# Patient Record
Sex: FEMALE | Race: WHITE
[De-identification: names, ages, dates, MRNs, and addresses within clinical notes are randomized per-mention and may not be internally consistent; named-entity substitution may affect disease eponyms.]

---

## 2018-01-05 ENCOUNTER — HOSPITAL ENCOUNTER (EMERGENCY)
Dept: HOSPITAL 80 - FED | Age: 38
Discharge: HOME | End: 2018-01-05
Payer: COMMERCIAL

## 2018-01-05 VITALS
SYSTOLIC BLOOD PRESSURE: 129 MMHG | RESPIRATION RATE: 16 BRPM | DIASTOLIC BLOOD PRESSURE: 78 MMHG | HEART RATE: 69 BPM | OXYGEN SATURATION: 92 %

## 2018-01-05 VITALS — TEMPERATURE: 98.1 F

## 2018-01-05 DIAGNOSIS — S49.92XA: ICD-10-CM

## 2018-01-05 DIAGNOSIS — Q76.49: ICD-10-CM
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
## 2018-01-05 DIAGNOSIS — S16.1XXA: Primary | ICD-10-CM
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
## 2018-01-05 NOTE — EDPHY
H & P


Stated Complaint: neck inj 12/20 reinj yesterday doing compressions/parethesia 

l arm/


Time Seen by Provider: 01/05/18 16:20





- Personal History


LMP (Females 10-55): 22-28 Days Ago


Current Tetanus/Diphtheria Vaccine: Yes





- Medical/Surgical History


Hx Asthma: No


Hx Chronic Respiratory Disease: No


Hx Diabetes: No


Hx Cardiac Disease: No


Hx Renal Disease: No


Hx Cirrhosis: No


Hx Alcoholism: No


Hx HIV/AIDS: No


Hx Splenectomy or Spleen Trauma: No


Other PMH: ADD





- Social History


Smoking Status: Never smoked


Constitutional: 


 Initial Vital Signs











Temperature (C)  36.7 C   01/05/18 16:10


 


Heart Rate  88   01/05/18 16:10


 


Respiratory Rate  17   01/05/18 16:10


 


Blood Pressure  138/83 H  01/05/18 16:10


 


O2 Sat (%)  97   01/05/18 16:10








 











O2 Delivery Mode               Room Air














Allergies/Adverse Reactions: 


 





No Known Allergies Allergy (Verified 01/05/18 16:08)


 








Home Medications: 














 Medication  Instructions  Recorded


 


Adderall 10 MG (RX)  02/09/14


 


Oxycodone HCl/Acetaminophen 1 - 2 tab PO Q6 PRN #30 tablet 02/26/14





[Oxycodone-Acetaminophen 5-325]  


 


Famotidine [Pepcid 20 MG (OTC)] 20 mg PO DAILY #10 tab 01/05/18


 


Ibuprofen [Motrin] 800 mg PO Q8 #20 tab 01/05/18


 


Methylprednisolone  01/05/18


 


Valium  01/05/18


 


oxyCODONE IR [Oxycodone Ir (*)] 5 - 10 mg PO Q6 PRN #20 tab 01/05/18














Medical Decision Making





- Diagnostics


Imaging Results: 


 Imaging Impressions





Cervical Spine MRI  01/05/18 16:21


Impression: 1. Degenerative disk disease between C4 and C6.


2. Possible left vertebral artery dissection. Recommend CT angiography.


 


Results discussed with Dr. Gareth Bland at 5:56 PM.








Neck CTA  01/05/18 18:04


Impression:


 


1. Each vertebral artery is patent, with no dissection. The right vertebral 

artery is more dominant of the two vessels, and the area of concern on the 

prior MRI appears to be related to congenital narrowing of the "osseous housing

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 

levels.


2. There is no hemodynamically significant carotid artery stenosis.


 


Measurement of carotid stenosis is based on the residual internal carotid 

diameter with [employer] Symptomatic Carotid Endarterectomy Trial (NASCET) 

based stenosis levels.


 


Findings were discussed with Gareth Bland MD at 19:43, on 1/5/2018.


 











Imaging: Discussed imaging studies w/ On call Radiologist, I viewed and 

interpreted images myself


ED Course/Re-evaluation: 





CHIEF COMPLAINT: Neck and left arm pain





HISTORY OF PRESENT ILLNESS: The patient is a 36 y/o female with history of 

recent neck injury in December complaining of recurrent neck pain and left arm 

radiculopathy after performing CPR at work today. She developed an acute neck 

injury mid December while golfing that caused neck pain radiating down her back 

and into her left arm. She started Medrol pack recently and has 2 days of pills 

left. After performing CPR this afternoon her symptoms worsened. She now has 

pain and paresthesias extending to her left thumb and middle finger and feels 

like her entire left arm and hand is weaker. She is normally healthy. 





REVIEW OF SYSTEMS:





A 10 point review of systems was performed and is negative with the exception 

of the elements mentioned in the history of present illness.





PHYSICAL EXAM:





HR, BP, O2 Sat, RR.  Temp noted


General Appearance: Alert, well hydrated, appropriate, and non-toxic appearing.


Head: Atraumatic without scalp tenderness or obvious injury


Eyes: Pupils equal, round, reactive to light and accommodation, EOMI, no trauma

, no injection.


Ears: Clear bilaterally, no perforation, normal landmarks


Nose: Atraumatic, no rhinorrhea, clear.


Throat: There is no erythema or exudates, no lesions, normal tonsils, mucus 

membranes moist.


Neck: Supple, 2+ carotid upstroke, non-tender, no lymphadenopathy.


Respiratory: No retractions, no distress, no wheezes, and no accessory muscle 

use. Lungs are clear to auscultation bilaterally.


Cardiovascular: Regular rate and rhythm, no murmurs, rubs, or gallops. 

Bilateral carotid, radial, dorsalis pedis, and posterior tibial pulses intact. 

Good capillary refill all extremities.


Gastrointestinal: Abdomen is soft, non-tender, non-distended, no masses, no 

rebound, no guarding, no peritoneal signs.


Musculoskeletal: Normal active ROM of all extremities, atraumatic.


Neurological: Alert, appropriate, and interactive. Left arm: 4/5 biceps and 4/5 

triceps strength, 4/5 left  weakness, mild intrinsic muscle weakness left 

hand, otherwise non-focal cranial nerves, motor, sensory, and cerebellar exam.


Skin: No rashes, good turgor, no nodules on palpation.





PAST MEDICAL HISTORY: ADD





PAST SURGICAL HISTORY: Noncontributory 





SOCIAL HISTORY: Family at bedside. Works as a nurse at [employer]. Nonsmoker.





DIAGNOSTICS/PROCEDURES/CRITICAL CARE TIME:





MRI Neck: degenerative disc disease C4-C6, possible vertebral artery dissection 

on the left requiring CTA for better evaluation.





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 





DIFFERENTIAL DIAGNOSIS: The differential diagnosis for the patient's neurologic 

deficits included but was not limited to vertebral artery dissection, disc 

herniation, peripheral causes, central causes including CVA, TIA,  electrolyte 

abnormalities and dehydration, cardiogenic causes, atypical causes like 

migraine syndrome.





MEDICAL DECISION MAKING:





This is a 36 y/o female who presents with worsening left-sided neck pain with 

left arm radiculopathy following an injury in mid-December and acutely 

worsening after performing CPR today at work. She describes paresthesias in the 

median nerve distribution of her left arm and has mildly reduced strength in 

left triceps, biceps, and intrinsics. She is already taking steroids for this, 

so we will not administer more today. Plan for neck MRI to rule out disc 

herniation and symptom management. 1mg IV Dilaudid, 4mg IV Zofran, 1mg IV Ativan

, 30mg IV Toradol, 1L IV NS administered. 





1805: Reassessed patient and discussed imaging results. MRI shows degenerative 

disc disease C4-C6 and possible left arterial dissection. Neck CTA recommended 

for further evaluation. She agrees to this imaging. Basic labs ordered.





2000: Reassessed patient and discussed CTA results. No dissection. She does 

have a congenitally narrowed left vertebral artery. She will be discharged with 

musculoskeletal neck pain care and follow up instructions. Return precautions 

discussed. She is comfortable with this plan. 





- Data Points


Laboratory Results: 


 











  01/05/18





  18:26


 


POC Hgb  12.9 gm/dL gm/dL





   (12.6-16.3) 


 


POC Hct  38 % %





   (38-47) 


 


POC Sodium  141 mEq/L mEq/L





   (134-144) 


 


POC Potassium  3.5 mEq/L mEq/L





   (3.3-5.0) 


 


POC Chloride  103 mEq/L mEq/L





   () 


 


POC BUN  12 mg/dL mg/dL





   (7-23) 


 


POC Creatinine  0.6 mg/dL mg/dL





   (0.6-1.0) 


 


POC Glucose  83 mg/dL mg/dL





   () 











Medications Given: 


 








Discontinued Medications





Hydromorphone HCl (Dilaudid)  1 mg IVP EDNOW ONE


   Stop: 01/05/18 16:39


   Last Admin: 01/05/18 17:05 Dose:  1 mg


Hydromorphone HCl (Dilaudid)  1 mg IVP EDNOW ONE


   Stop: 01/05/18 19:18


   Last Admin: 01/05/18 19:19 Dose:  1 mg


Ketorolac Tromethamine (Toradol)  30 mg IVP EDNOW ONE


   Stop: 01/05/18 16:39


   Last Admin: 01/05/18 17:02 Dose:  30 mg


Lorazepam (Ativan Injection)  1 mg IVP EDNOW ONE


   Stop: 01/05/18 16:40


   Last Admin: 01/05/18 17:05 Dose:  1 mg


Ondansetron HCl (Zofran)  4 mg IVP EDNOW ONE


   Stop: 01/05/18 16:39


   Last Admin: 01/05/18 17:01 Dose:  4 mg





Point of Care Test Results: 


 











  01/05/18





  18:26


 


POC Sodium  141


 


POC Potassium  3.5


 


POC Chloride  103


 


POC BUN  12


 


POC Creatinine  0.6


 


POC Glucose  83














Departure





- Departure


Disposition: Home, Routine, Self-Care


Clinical Impression: 


 congenital narrow L vertebral dissection, Radicular pain of left upper 

extremity





Neck strain


Qualifiers:


 Encounter type: initial encounter Qualified Code(s): S16.1XXA - Strain of 

muscle, fascia and tendon at neck level, initial encounter





Condition: Good


Instructions:  Cervical Strain (DC), Acute Neck Pain (ED)


Additional Instructions: 


1. Take 600-800mg ibuprofen every 6-8 hours for pain and inflammation for the 

next few days. Take with food and Pepcid or Prilosec.


2. Use OxyIR as prescribed as needed for severe pain. This medication can make 

you drowsy. Do not use prior to driving or operating machinery. 


3. Follow up with your primary care provider or spine specialist for unimproved 

symptoms over the next week. 


4. Return to the ED for worsening of condition.


Referrals: 


KASSANDRA ZAYAS [Primary Care Provider] - As per Instructions


[facility] [Outside] - As per Instructions


Prescriptions: 


Famotidine [Pepcid 20 MG (OTC)] 20 mg PO DAILY #10 tab


Ibuprofen [Motrin] 800 mg PO Q8 #20 tab


oxyCODONE IR [Oxycodone Ir (*)] 5 - 10 mg PO Q6 PRN #20 tab


 PRN Reason: Pain, Severe


Report Scribed for: Gareth Bland


Report Scribed by: Adalgisa Gupta


Date of Report: 01/05/18


Time of Report: 19:25